# Patient Record
Sex: MALE | Race: WHITE
[De-identification: names, ages, dates, MRNs, and addresses within clinical notes are randomized per-mention and may not be internally consistent; named-entity substitution may affect disease eponyms.]

---

## 2018-08-18 ENCOUNTER — HOSPITAL ENCOUNTER (EMERGENCY)
Dept: HOSPITAL 25 - UCEAST | Age: 53
Discharge: HOME | End: 2018-08-18
Payer: COMMERCIAL

## 2018-08-18 VITALS — DIASTOLIC BLOOD PRESSURE: 74 MMHG | SYSTOLIC BLOOD PRESSURE: 113 MMHG

## 2018-08-18 DIAGNOSIS — H66.91: Primary | ICD-10-CM

## 2018-08-18 PROCEDURE — 99202 OFFICE O/P NEW SF 15 MIN: CPT

## 2018-08-18 PROCEDURE — G0463 HOSPITAL OUTPT CLINIC VISIT: HCPCS

## 2018-08-18 NOTE — UC
Ear Complaint HPI





- HPI Summary


HPI Summary: 





This is Yuli gray, documenting for attending Lori Ramírez MD.





This patient is a 53 year old M presenting to [facility] accompanied by his 

wife with a chief complaint of right ear pain and pressure today. Reports post 

nasal drip. Pain unchanged by Alieve. Denies drainage from ears, dental pain, 

fever, and chills. Pain is 2/10 in severity, upon triage. Patient reports 

recent swimming at the beach. 


No active medications.





I, Dr. Ramírez, personally performed the services described in this 

documentation as scribed in my presence and it is both accurate and complete.








- History of Current Complaint


Chief Complaint: UCEar


Stated Complaint: EAR ACHE


Time Seen by Provider: 08/18/18 13:07


Hx Obtained From: Patient


Onset/Duration: Lasting Hours


Pain Intensity: 2


Pain Scale Used: 0-10 Numeric


Alleviating Factors: Nothing





- Allergies/Home Medications


Allergies/Adverse Reactions: 


 Allergies











Allergy/AdvReac Type Severity Reaction Status Date / Time


 


No Known Allergies Allergy   Verified 08/18/18 13:01














PMH/Surg Hx/FS Hx/Imm Hx


Previously Healthy: Yes





- Surgical History


Surgical History: Yes


Surgery Procedure, Year, and Place: appy





- Family History


Known Family History: Positive: Other - non contributory


   Negative: Respiratory Disease





- Social History


Occupation: Employed Full-time


Lives: With Family


Alcohol Use: Occasionally


Substance Use Type: None


Smoking Status (MU): Never Smoked Tobacco





Review of Systems


Constitutional: Negative


ENT: Negative - dental pain, ear drainage, Ear Ache, Other - post nasal drip


All Other Systems Reviewed And Are Negative: Yes





Physical Exam





- Summary


Physical Exam Summary: 





Vital Signs Reviewed: Yes


A+Ox3, no distress


Eyes: Conjunctiva Clear, TIGRE. EOM intact and full


ENT: Hearing grossly normal  Right TM fluid, erythema, buldging  left TM scant 

fluid, turbiantes boggy,  mmoist, uvula midline, no exudate, no erythema


Neck: Positive: Supple


Respiratory: Positive: No respiratory distress, No accessory muscle use + CTA 

throughout  no w/r


Cardiovascular: RRR  nl s1, s2  no m/r  CBT <2  sec


abd soft + BS nt/nd  no guarding, no distension


Musculoskeletal Exam: ESTRADA x 4 without difficulty Strength Intact, ROM Intact


Neurological: Positive: Alert,  + sensation throughout


Psychological: Positive: Normal Response To Family


Skin: Positive: no rash, no ecchymosis


Triage Information Reviewed: Yes


Vital Signs: 


 Initial Vital Signs











Temp  98.2 F   08/18/18 12:58


 


Pulse  89   08/18/18 12:58


 


Resp  17   08/18/18 12:58


 


BP  113/74   08/18/18 12:58


 


Pulse Ox  100   08/18/18 12:58














Ear Complaint Course/Dx





- Course


Course Of Treatment: Pt presents with right ear pain. Pt with erythema, 

buldging right TM  turbinates boggy.  VSS.  Will start abx.  flonase.  

decongestant.  secretion precaution.  motrin/apap return





- Differential Dx/Diagnosis


Provider Diagnoses: right OM





Discharge





- Sign-Out/Discharge


Documenting (check all that apply): Patient Departure


All imaging exams completed and their final reports reviewed: No Studies





- Discharge Plan


Condition: Stable


Disposition: HOME


Prescriptions: 


Amoxicillin PO (*) [Amoxicillin 875 MG (*)] 875 mg PO BID #20 tab


Fluticasone NASAL SPRAY 50MCG* [Flonase NASAL SPRAY 50MCG*] 2 spray BOTH NARES 

DAILY #1 btl


Patient Education Materials:  Ear Infection (ED)


Referrals: 


No Primary Care Phys,NOPCP [Primary Care Provider] - 


Additional Instructions: 


- Stay well hydrated. Drink plenty of non-alcoholic, non-caffinated beverages.


- Alternate ibuprofen (Advil, Motrin) 600mg and Tylenol every 3 hours for pain 

or fever.  Take with food. Do NOT take for more than 4-5 days. 


- These infections are spread by secretions - do NOT share eating or drinking 

utensils - clean items you share with other people such as cell phones, 

computer mouse, TV remote, computer tablets,etc. 


Once you have been antibiotics for 2 days,  change your toothbrush and your 

pillowcase.


- okay to take over the counter decongestant - Claritin D, Allegra D, Zyrtec D  

- it is recommended you take before boarding airplane tomorrow


- use nasal spray as prescribed


- take antibiotics as prescribed - they will likely cause diarrhea


- contact your doctor or return with questions or concerns





- Billing Disposition and Condition


Condition: STABLE


Disposition: Home





- Attestation Statements


Document Initiated by Scribe: Yes


Documenting Scribe:  Yuli Brandt,


Provider For Whom Scribe is Documenting (Include Credential): Lori Ramírez MD


Scribe Attestation: 


I,  Yuli Brandt,, scribed for Lori Ramírez MD on 08/20/18 at 1941. 


Scribe Documentation Reviewed: Yes


Provider Attestation: 


The documentation as recorded by the scribe,  Yuli Brandt, accurately 

reflects the service I personally performed and the decisions made by me, Lori Ramírez MD